# Patient Record
Sex: FEMALE | Race: WHITE | NOT HISPANIC OR LATINO | Employment: STUDENT | URBAN - METROPOLITAN AREA
[De-identification: names, ages, dates, MRNs, and addresses within clinical notes are randomized per-mention and may not be internally consistent; named-entity substitution may affect disease eponyms.]

---
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
## 2022-01-06 ENCOUNTER — OFFICE VISIT (OUTPATIENT)
Dept: ORTHOPEDIC SURGERY | Facility: CLINIC | Age: 22
End: 2022-01-06

## 2022-01-06 VITALS — HEIGHT: 68 IN | WEIGHT: 159 LBS | TEMPERATURE: 97.8 F | BODY MASS INDEX: 24.1 KG/M2

## 2022-01-06 DIAGNOSIS — M25.531 RIGHT WRIST PAIN: Primary | ICD-10-CM

## 2022-01-06 PROCEDURE — 73110 X-RAY EXAM OF WRIST: CPT | Performed by: ORTHOPAEDIC SURGERY

## 2022-01-06 PROCEDURE — 99202 OFFICE O/P NEW SF 15 MIN: CPT | Performed by: ORTHOPAEDIC SURGERY

## 2022-01-06 NOTE — PROGRESS NOTES
Here for evaluation of her right wrist.  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  She is working out with her  when she was doing some form of box step using a barbell when she essentially stumbled fell back hyperextended her wrist while using the barn it pinned her wrist backwards as she went to her knees.  She has had considerable pain about the radial aspect of the right wrist and distal forearm.  She had no previous problems.    On exam she has tenderness about the flexor carpi radialis and brachioradialis tendons there is no tenderness about the anatomic snuffbox there is no tenderness about the distal radius or the carpal bones.  The scaphoid tubercle is nontender to palpation.  She has no pain with palpation of the metacarpals.  The hand is well-perfused there is moderate diffuse right wrist swelling    X-rays 3 views of the right wrist AP lateral oblique ordered and reviewed for evaluation of right wrist which show no abnormality.  There are no previous films or comparison.    Right wrist likely a strain of the FCR/brachioradialis tendons.  I recommended ice to the area I gave her a cock-up wrist splint to use.  She can come out of the wrist splint a few times a day to work on stretching and range of motion.  She should avoid any lifting or strain to the wrist until this is improved.  If she is not significantly improved in 2 weeks she will call back and I would most likely consider a right wrist MRI

## 2022-08-08 ENCOUNTER — NURSE ONLY (OUTPATIENT)
Dept: CARDIOLOGY CLINIC | Age: 22
End: 2022-08-08
Payer: COMMERCIAL

## 2022-08-08 DIAGNOSIS — Z02.5 SPORTS PHYSICAL: Primary | ICD-10-CM

## 2022-08-08 PROCEDURE — 93000 ELECTROCARDIOGRAM COMPLETE: CPT | Performed by: INTERNAL MEDICINE

## 2023-08-07 ENCOUNTER — NURSE ONLY (OUTPATIENT)
Dept: CARDIOLOGY CLINIC | Age: 23
End: 2023-08-07
Payer: COMMERCIAL

## 2023-08-07 DIAGNOSIS — Z02.5 SPORTS PHYSICAL: Primary | ICD-10-CM

## 2023-08-07 PROCEDURE — 93000 ELECTROCARDIOGRAM COMPLETE: CPT | Performed by: INTERNAL MEDICINE
